# Patient Record
Sex: FEMALE | Race: AMERICAN INDIAN OR ALASKA NATIVE | Employment: UNEMPLOYED
[De-identification: names, ages, dates, MRNs, and addresses within clinical notes are randomized per-mention and may not be internally consistent; named-entity substitution may affect disease eponyms.]

---

## 2017-07-15 ENCOUNTER — HEALTH MAINTENANCE LETTER (OUTPATIENT)
Age: 62
End: 2017-07-15

## 2017-12-26 ENCOUNTER — MEDICAL CORRESPONDENCE (OUTPATIENT)
Dept: HEALTH INFORMATION MANAGEMENT | Facility: CLINIC | Age: 62
End: 2017-12-26

## 2017-12-28 ENCOUNTER — TRANSFERRED RECORDS (OUTPATIENT)
Dept: HEALTH INFORMATION MANAGEMENT | Facility: CLINIC | Age: 62
End: 2017-12-28

## 2017-12-28 ENCOUNTER — TELEPHONE (OUTPATIENT)
Dept: OPHTHALMOLOGY | Facility: CLINIC | Age: 62
End: 2017-12-28

## 2017-12-28 NOTE — TELEPHONE ENCOUNTER
LMVM for pt to call MME call center and register/make appt for Eval as referred from [facility] for '[facility]omalies of [facility]clera'  [facility] notes at [facility] ~ smr

## 2017-12-29 ENCOUNTER — TRANSFERRED RECORDS (OUTPATIENT)
Dept: HEALTH INFORMATION MANAGEMENT | Facility: CLINIC | Age: 62
End: 2017-12-29

## 2018-01-17 ENCOUNTER — OFFICE VISIT (OUTPATIENT)
Dept: OPHTHALMOLOGY | Facility: CLINIC | Age: 63
End: 2018-01-17
Payer: COMMERCIAL

## 2018-01-17 DIAGNOSIS — H11.153 PINGUECULA OF BOTH EYES: ICD-10-CM

## 2018-01-17 DIAGNOSIS — H57.12 PAIN AROUND LEFT EYE: Primary | ICD-10-CM

## 2018-01-17 DIAGNOSIS — H02.9 LESION OF LOWER EYELID: ICD-10-CM

## 2018-01-17 DIAGNOSIS — H25.13 NUCLEAR SCLEROTIC CATARACT OF BOTH EYES: ICD-10-CM

## 2018-01-17 DIAGNOSIS — H04.123 DRY EYES, BILATERAL: ICD-10-CM

## 2018-01-17 RX ORDER — METHADONE HYDROCHLORIDE 10 MG/ML
850 CONCENTRATE ORAL
COMMUNITY
Start: 2017-12-20

## 2018-01-17 RX ORDER — GABAPENTIN 300 MG/1
600 CAPSULE ORAL
COMMUNITY
Start: 2017-12-20

## 2018-01-17 RX ORDER — ACETAMINOPHEN 325 MG/1
650 TABLET ORAL
COMMUNITY
Start: 2013-03-31

## 2018-01-17 RX ORDER — METHOCARBAMOL 500 MG/1
1000 TABLET, FILM COATED ORAL
COMMUNITY
Start: 2017-12-20

## 2018-01-17 RX ORDER — MELOXICAM 7.5 MG/1
15 TABLET ORAL
COMMUNITY
Start: 2017-12-20

## 2018-01-17 RX ORDER — CYANOCOBALAMIN 1000 UG/ML
100 INJECTION, SOLUTION INTRAMUSCULAR; SUBCUTANEOUS
COMMUNITY
Start: 2017-12-20

## 2018-01-17 RX ORDER — ALBUTEROL SULFATE 90 UG/1
2 AEROSOL, METERED RESPIRATORY (INHALATION)
COMMUNITY
Start: 2013-12-19

## 2018-01-17 ASSESSMENT — CONF VISUAL FIELD
OD_NORMAL: 1
OS_NORMAL: 1
METHOD: COUNTING FINGERS

## 2018-01-17 ASSESSMENT — REFRACTION_WEARINGRX
OD_ADD: +2.75
OS_ADD: +2.75
SPECS_TYPE: FT28
OS_AXIS: 180
OD_AXIS: 160
OS_SPHERE: +2.50
OD_SPHERE: +2.50
OS_CYLINDER: +0.75
OD_CYLINDER: +0.25

## 2018-01-17 ASSESSMENT — TONOMETRY
OD_IOP_MMHG: 11
IOP_METHOD: ICARE
OS_IOP_MMHG: 10

## 2018-01-17 ASSESSMENT — EXTERNAL EXAM - LEFT EYE: OS_EXAM: NORMAL

## 2018-01-17 ASSESSMENT — VISUAL ACUITY
OD_CC: 20/20
CORRECTION_TYPE: GLASSES
OS_CC: 20/15-2
METHOD: SNELLEN - LINEAR

## 2018-01-17 ASSESSMENT — SLIT LAMP EXAM - LIDS: COMMENTS: NORMAL
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
## 2018-01-17 NOTE — NURSING NOTE
"Chief Complaints and History of Present Illnesses   Patient presents with     Consult For     bubble on white part of eye     HPI    Affected eye(s):  Left   Symptoms:     No decreased vision   Dryness         Do you have eye pain now?:  Yes   Location:  OS   Pain Level:  Moderate Pain (4)   Pain Duration:  1 day   Pain Frequency:  Intermittent   Pain Characteristics:  Aching      Comments:  Pt was seen for routine eye examination by [facility] on [address] in [place] in December.  That optometrist noticed a \"bubble\" on the left eye sclera and told her that she should see an ophthalmologist as there was nothing that could be done at [facility] for it. Pt states she also has the beginning of cataracts.  She got her new glasses rx filled and is wearing them today.  Pt states she is having pain in her LE and it feels tired and dry intermittently.    She would like a report of today's findings to go to her doctor at the [facility],  Ann Perez, COA 9:15 AM 01/17/2018               "

## 2018-01-17 NOTE — MR AVS SNAPSHOT
After Visit Summary   1/17/2018    Phyllis Gnozalez    MRN: [id number]           Patient Information     Date Of Birth          1955        Visit Information        Provider Department      1/17/2018 9:20 AM Cristal Palacio MD [facility]        Today's Diagnoses     Pain around left eye - Left Eye    -  1    Dry eyes, bilateral - Both Eyes        Nuclear sclerotic cataract of both eyes - Both Eyes        Pinguecula of both eyes - Both Eyes        Lesion of lower eyelid - Left Eye          Care Instructions    Breast Cancer Screening: During our visit today, we discussed that it is recommended you receive breast cancer screening. Please call or make an appointment with your primary care provider to discuss this with them. You may also call the [facility] scheduling line ([phone]) to set up a mammography appointment at the Breast Center within the [facility].    Colorectal Cancer Screening: During our visit today, we discussed that it is recommended you receive colorectal cancer screening. Please call or make an appointment with your primary care provider to discuss this. You may also call the [facility] scheduling line ([phone]) to set up a colonoscopy appointment.              Follow-ups after your visit        Follow-up notes from your care team     Return in about 2 weeks (around 1/31/2018) for follow up- dry eye.      Who to contact     Please call your clinic at [phone] to:    Ask questions about your health    Make or cancel appointments    Discuss your medicines    Learn about your test results    Speak to your doctor   If you have compliments or concerns about an experience at your clinic, or if you wish to file a complaint, please contact [facility] Patient Relations at [phone] or email us at [email or web address]         Additional Information About Your Visit         [employer] Information     [employer] is an electronic gateway that provides easy, online access to your medical records. With [employer], you can request a clinic appointment, read your test results, renew a prescription or communicate with your care team.     To sign up for [employer] visit the website at [email or web address]   You will be asked to enter the access code listed below, as well as some personal information. Please follow the directions to create your username and password.     Your access code is: [email or web address]  Expires: 2018  6:30 AM     Your access code will  in 90 days. If you need help or a new code, please contact your [facility] or call [phone] for assistance.        Care EveryWhere ID     This is your Care EveryWhere ID. This could be used by other organizations to access your [place] medical records  [id number]         Blood Pressure from Last 3 Encounters:   14 136/86   13 114/72   10/15/13 111/70    Weight from Last 3 Encounters:   14 65 kg (143 lb 6.4 oz)   13 56.2 kg (124 lb)   10/15/13 55.4 kg (122 lb 3.2 oz)              Today, you had the following     No orders found for display       Primary Care Provider Office Phone # Fax #    Ann Veronica Cruz [phone]        [facility] [address] [facility] [address]        Equal Access to Services     KAY DAVIS : Hadii alvarez ku hadasho Soomaali, waaxda luqadaha, qaybta kaalmada adeegyada, batool torres . So [facility] [phone].    ATENCIÓN: Si habla español, tiene a shook disposición servicios gratuitos de asistencia lingüística. Llame al [phone].    We comply with applicable federal civil rights laws and [place] laws. We do not discriminate on the basis of race, color, national origin, age, disability, sex, sexual orientation, or gender identity.            Thank you!     Thank you for choosing  [facility]  for your care. Our goal is always to provide you with excellent care. Hearing back from our patients is one way we can continue to improve our services. Please take a few minutes to complete the written survey that you may receive in the mail after your visit with us. Thank you!             Your Updated Medication List - Protect others around you: Learn how to safely use, store and throw away your medicines at www.disposemymeds.org.          This list is accurate as of: 1/17/18 11:36 AM.  Always use your most recent med list.                   Brand Name Dispense Instructions for use Diagnosis    * acetaminophen 325 MG tablet    TYLENOL     Take 650 mg by mouth        * acetaminophen 500 MG tablet    TYLENOL     Take 1 tablet (500 mg) by mouth every 6 hours as needed for pain    Back pain       * albuterol 1.25 MG/3ML nebulizer solution    ACCUNEB    100 vial    Take 3 mLs (1.25 mg) by nebulization every 6 hours as needed    Wheezing       * albuterol 108 (90 BASE) MCG/ACT Inhaler    PROAIR HFA/PROVENTIL HFA/VENTOLIN HFA    1 Inhaler    Inhale 2 puffs into the lungs every 6 hours as needed for shortness of breath / dyspnea    Intermittent asthma       * albuterol 108 (90 BASE) MCG/ACT Inhaler    PROAIR HFA/PROVENTIL HFA/VENTOLIN HFA     Inhale 2 puffs into the lungs        * buPROPion 150 MG 24 hr tablet    WELLBUTRIN XL    30 tablet    Take 1 tablet (150 mg) by mouth every morning Take with 300mg tablet for total 450mg daily.    Anxiety       * buPROPion 300 MG 24 hr tablet    WELLBUTRIN XL    90 tablet    Take 1 tablet (300 mg) by mouth every morning    Depression       cloNIDine 0.2 MG/24HR WK patch    CATAPRES-TTS2    4 patch    Place 1 patch onto the skin once a week    Anxiety       cyanocobalamin 1000 MCG/ML injection    VITAMIN B12     Inject 100 mcg into the muscle        D 5000 5000 UNITS Tabs   Generic drug:  Cholecalciferol      Take 5,000 Units by mouth        *  gabapentin 300 MG capsule    NEURONTIN    270 capsule    Take 3 capsules (900 mg) by mouth 3 times daily Total of 900 mg 3 times daily.    Fibromyalgia       * gabapentin 300 MG capsule    NEURONTIN     Take 600 mg by mouth        ketorolac 30 MG/ML injection    TORADOL    20 mL    Inject 1 mL (30 mg) into the vein every 6 hours as needed    Hip pain       lidocaine 5 % ointment    XYLOCAINE    60 g    Apply topically 4 times daily as needed (to hip and back)    Fall       loratadine-pseudoePHEDrine  MG per 24 hr tablet    CLARITIN-D 24-hour    14 tablet    Take 1 tablet by mouth daily    Seasonal allergies       meloxicam 7.5 MG tablet    MOBIC     Take 15 mg by mouth        methadone 10 MG/ML (HIGH CONC) solution    DOLOPHINE-INTENSOL     Take 850 mg by mouth        methocarbamol 500 MG tablet    ROBAXIN     Take 1,000 mg by mouth        NEBULIZER           order for DME     1 each    Equipment being ordered: nebulizer, adult mouthpiece, and tubing.    Intermittent asthma       * Notice:  This list has 9 medication(s) that are the same as other medications prescribed for you. Read the directions carefully, and ask your doctor or other care provider to review them with you.

## 2018-01-17 NOTE — PATIENT INSTRUCTIONS
Breast Cancer Screening: During our visit today, we discussed that it is recommended you receive breast cancer screening. Please call or make an appointment with your primary care provider to discuss this with them. You may also call the [facility] scheduling line ([phone]) to set up a mammography appointment at the Breast Center within the [facility].    Colorectal Cancer Screening: During our visit today, we discussed that it is recommended you receive colorectal cancer screening. Please call or make an appointment with your primary care provider to discuss this. You may also call the  [facility] scheduling line ([phone]) to set up a colonoscopy appointment.

## 2018-02-06 ENCOUNTER — OFFICE VISIT (OUTPATIENT)
Dept: OPHTHALMOLOGY | Facility: CLINIC | Age: 63
End: 2018-02-06
Payer: COMMERCIAL

## 2018-02-06 DIAGNOSIS — H04.123 DRY EYE SYNDROME, BILATERAL: Primary | ICD-10-CM

## 2018-02-06 DIAGNOSIS — H57.12 PAIN AROUND LEFT EYE: ICD-10-CM

## 2018-02-06 ASSESSMENT — REFRACTION_WEARINGRX
OD_ADD: +2.75
OS_SPHERE: +2.50
SPECS_TYPE: FT28
OD_CYLINDER: +0.25
OD_AXIS: 160
OD_SPHERE: +2.50
OS_CYLINDER: +0.75
OS_ADD: +2.75
OS_AXIS: 180

## 2018-02-06 ASSESSMENT — SLIT LAMP EXAM - LIDS: COMMENTS: NORMAL

## 2018-02-06 ASSESSMENT — TONOMETRY
OD_IOP_MMHG: 15
OS_IOP_MMHG: 15
IOP_METHOD: ICARE

## 2018-02-06 ASSESSMENT — CONF VISUAL FIELD
OD_NORMAL: 1
METHOD: COUNTING FINGERS
OS_NORMAL: 1

## 2018-02-06 ASSESSMENT — VISUAL ACUITY
CORRECTION_TYPE: GLASSES
OD_CC: 20/20-2
METHOD: SNELLEN - LINEAR
OS_CC: 20/20

## 2018-02-06 NOTE — MR AVS SNAPSHOT
After Visit Summary   2018    Phyllis Gonzalez    MRN: [id number]           Patient Information     Date Of Birth          1955        Visit Information        Provider Department      2018 9:00 AM Cristal Palacio MD [facility]         Follow-ups after your visit        Follow-up notes from your care team     Return in about 2 weeks (around 2018) for follow up- puntal plug.      Your next 10 appointments already scheduled     2018  8:40 AM CST   Return Visit with Cristal Palacio MD   [facility]    [facility]  [address] [address] [phone]              Who to contact     Please call your clinic at [phone] to:    Ask questions about your health    Make or cancel appointments    Discuss your medicines    Learn about your test results    Speak to your doctor   If you have compliments or concerns about an experience at your clinic, or if you wish to file a complaint, please contact [facility] Patient Relations at [phone] or email us at [email or web address]         Additional Information About Your Visit        MyChart Information     [employer] is an electronic gateway that provides easy, online access to your medical records. With [employer], you can request a clinic appointment, read your test results, renew a prescription or communicate with your care team.     To sign up for [employer] visit the website at [email or web address]   You will be asked to enter the access code listed below, as well as some personal information. Please follow the directions to create your username and password.     Your access code is: [email or web address]  Expires: 2018  6:30 AM     Your access code will  in 90 days. If you need help or a new code, please contact your [facility] or call [phone] for assistance.        Care EveryWhere ID     This is your Care EveryWhere ID. This could be used by other organizations to access your [place] medical records  [id number]         Blood Pressure from Last 3 Encounters:   07/29/14 136/86   11/22/13 114/72   10/15/13 111/70    Weight from Last 3 Encounters:   07/29/14 65 kg (143 lb 6.4 oz)   11/22/13 56.2 kg (124 lb)   10/15/13 55.4 kg (122 lb 3.2 oz)              Today, you had the following     No orders found for display       Primary Care Provider Office Phone # Fax #    Ann Veronica Cruz [phone]        [facility] [address] [facility] [address]        Equal Access to Services     KAY DAVIS : Hadii alvarez barker hadasho Soomaali, waaxda luqadaha, qaybta kaalmada adeegyada, batool torres . So [facility] [phone].    ATENCIÓN: Si habla español, tiene a shook disposición servicios gratuitos de asistencia lingüística. Raymond al [phone].    We comply with applicable federal civil rights laws and [place] laws. We do not discriminate on the basis of race, color, national origin, age, disability, sex, sexual orientation, or gender identity.            Thank you!     Thank you for choosing [facility] [place]  for your care. Our goal is always to provide you with excellent care. Hearing back from our patients is one way we can continue to improve our services. Please take a few minutes to complete the written survey that you may receive in the mail after your visit with us. Thank you!             Your Updated Medication List - Protect others around you: Learn how to safely use, store and throw away your medicines at www.disposemymeds.org.          This list is accurate as of 2/6/18  9:37 AM.  Always use your most recent med list.                   Brand Name Dispense Instructions for use Diagnosis    * acetaminophen 325 MG tablet    TYLENOL      Take 650 mg by mouth        * acetaminophen 500 MG tablet    TYLENOL     Take 1 tablet (500 mg) by mouth every 6 hours as needed for pain    Back pain       * albuterol 1.25 MG/3ML nebulizer solution    ACCUNEB    100 vial    Take 3 mLs (1.25 mg) by nebulization every 6 hours as needed    Wheezing       * albuterol 108 (90 BASE) MCG/ACT Inhaler    PROAIR HFA/PROVENTIL HFA/VENTOLIN HFA    1 Inhaler    Inhale 2 puffs into the lungs every 6 hours as needed for shortness of breath / dyspnea    Intermittent asthma       * albuterol 108 (90 BASE) MCG/ACT Inhaler    PROAIR HFA/PROVENTIL HFA/VENTOLIN HFA     Inhale 2 puffs into the lungs        * buPROPion 150 MG 24 hr tablet    WELLBUTRIN XL    30 tablet    Take 1 tablet (150 mg) by mouth every morning Take with 300mg tablet for total 450mg daily.    Anxiety       * buPROPion 300 MG 24 hr tablet    WELLBUTRIN XL    90 tablet    Take 1 tablet (300 mg) by mouth every morning    Depression       cloNIDine 0.2 MG/24HR WK patch    CATAPRES-TTS2    4 patch    Place 1 patch onto the skin once a week    Anxiety       cyanocobalamin 1000 MCG/ML injection    VITAMIN B12     Inject 100 mcg into the muscle        D 5000 5000 UNITS Tabs   Generic drug:  Cholecalciferol      Take 5,000 Units by mouth        * gabapentin 300 MG capsule    NEURONTIN    270 capsule    Take 3 capsules (900 mg) by mouth 3 times daily Total of 900 mg 3 times daily.    Fibromyalgia       * gabapentin 300 MG capsule    NEURONTIN     Take 600 mg by mouth        ketorolac 30 MG/ML injection    TORADOL    20 mL    Inject 1 mL (30 mg) into the vein every 6 hours as needed    Hip pain       lidocaine 5 % ointment    XYLOCAINE    60 g    Apply topically 4 times daily as needed (to hip and back)    Fall       loratadine-pseudoePHEDrine  MG per 24 hr tablet    CLARITIN-D 24-hour    14 tablet    Take 1 tablet by mouth daily    Seasonal allergies       meloxicam 7.5 MG tablet    MOBIC     Take 15 mg by mouth         methadone 10 MG/ML (HIGH CONC) solution    DOLOPHINE-INTENSOL     Take 850 mg by mouth        methocarbamol 500 MG tablet    ROBAXIN     Take 1,000 mg by mouth        NEBULIZER           order for DME     1 each    Equipment being ordered: nebulizer, adult mouthpiece, and tubing.    Intermittent asthma       * Notice:  This list has 9 medication(s) that are the same as other medications prescribed for you. Read the directions carefully, and ask your doctor or other care provider to review them with you.
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
## 2018-02-06 NOTE — NURSING NOTE
Chief Complaints and History of Present Illnesses   Patient presents with     Follow Up For     dry eye      HPI    Affected eye(s):  Both   Symptoms:     Blurred vision   Redness   Dryness      Duration:  3 weeks   Frequency:  Intermittent       Do you have eye pain now?:  No      Comments:  Pt here for follow up dry eye - pt has been using artificial tears once in the morning and once in the evening.  She ran out after 1 week and hasn't used them since then.      Pt feels like the white of her eyes were red when she was using the artificial tears.  She also notices difficulty seeing sometimes and almost like a double vision, she feels like it is related to the dryness.      She states she stopped using the artificial tears because of the redness, not because she ran out.    Nette Perez, COA 9:03 AM 02/06/2018

## 2020-01-07 ENCOUNTER — MEDICAL CORRESPONDENCE (OUTPATIENT)
Dept: HEALTH INFORMATION MANAGEMENT | Facility: CLINIC | Age: 65
End: 2020-01-07

## 2020-01-16 ENCOUNTER — TRANSFERRED RECORDS (OUTPATIENT)
Dept: PHYSICAL THERAPY | Facility: CLINIC | Age: 65
End: 2020-01-16